# Patient Record
Sex: MALE | Employment: FULL TIME | URBAN - METROPOLITAN AREA
[De-identification: names, ages, dates, MRNs, and addresses within clinical notes are randomized per-mention and may not be internally consistent; named-entity substitution may affect disease eponyms.]

---

## 2024-05-23 ENCOUNTER — HOSPITAL ENCOUNTER (OUTPATIENT)
Facility: MEDICAL CENTER | Age: 39
End: 2024-05-23
Attending: PREVENTIVE MEDICINE
Payer: COMMERCIAL

## 2024-05-23 ENCOUNTER — EH NON-PROVIDER (OUTPATIENT)
Dept: OCCUPATIONAL MEDICINE | Facility: CLINIC | Age: 39
End: 2024-05-23

## 2024-05-23 ENCOUNTER — EMPLOYEE HEALTH (OUTPATIENT)
Dept: OCCUPATIONAL MEDICINE | Facility: CLINIC | Age: 39
End: 2024-05-23

## 2024-05-23 DIAGNOSIS — Z02.89 VISIT FOR OCCUPATIONAL HEALTH EXAMINATION: Primary | ICD-10-CM

## 2024-05-23 DIAGNOSIS — Z02.89 VISIT FOR OCCUPATIONAL HEALTH EXAMINATION: ICD-10-CM

## 2024-05-23 LAB
AMP AMPHETAMINE: NORMAL
BAR BARBITURATES: NORMAL
BZO BENZODIAZEPINES: NORMAL
COC COCAINE: NORMAL
INT CON NEG: NORMAL
INT CON POS: NORMAL
MDMA ECSTASY: NORMAL
MET METHAMPHETAMINES: NORMAL
MTD METHADONE: NORMAL
OPI OPIATES: NORMAL
OXY OXYCODONE: NORMAL
PCP PHENCYCLIDINE: NORMAL
POC URINE DRUG SCREEN OCDRS: NORMAL
THC: NORMAL

## 2024-05-23 PROCEDURE — 94375 RESPIRATORY FLOW VOLUME LOOP: CPT | Performed by: PREVENTIVE MEDICINE

## 2024-05-23 PROCEDURE — 86480 TB TEST CELL IMMUN MEASURE: CPT | Performed by: PREVENTIVE MEDICINE

## 2024-05-23 PROCEDURE — 8915 PR COMPREHENSIVE PHYSICAL: Performed by: PREVENTIVE MEDICINE

## 2024-05-23 PROCEDURE — 80305 DRUG TEST PRSMV DIR OPT OBS: CPT | Performed by: PREVENTIVE MEDICINE

## 2024-05-23 NOTE — PROGRESS NOTES
Pre Employment Drug Screen Completed  Mask Fit Required at this time  Physical is required at this time.  Docs: Hep B, MMR, VZV, COVID, and Tdap   Titers: TB  Color Blindness 14/14

## 2024-05-26 LAB
GAMMA INTERFERON BACKGROUND BLD IA-ACNC: 0.02 IU/ML
M TB IFN-G BLD-IMP: NEGATIVE
M TB IFN-G CD4+ BCKGRND COR BLD-ACNC: 0 IU/ML
MITOGEN IGNF BCKGRD COR BLD-ACNC: >10 IU/ML
QFT TB2 - NIL TBQ2: 0.01 IU/ML

## 2024-09-17 ENCOUNTER — OFFICE VISIT (OUTPATIENT)
Dept: URGENT CARE | Facility: CLINIC | Age: 39
End: 2024-09-17
Payer: COMMERCIAL

## 2024-09-17 VITALS
OXYGEN SATURATION: 98 % | HEIGHT: 74 IN | TEMPERATURE: 98.2 F | RESPIRATION RATE: 16 BRPM | SYSTOLIC BLOOD PRESSURE: 128 MMHG | BODY MASS INDEX: 23.74 KG/M2 | HEART RATE: 74 BPM | DIASTOLIC BLOOD PRESSURE: 76 MMHG | WEIGHT: 185 LBS

## 2024-09-17 DIAGNOSIS — F41.9 ANXIETY AND DEPRESSION: ICD-10-CM

## 2024-09-17 DIAGNOSIS — Z76.0 ENCOUNTER FOR MEDICATION REFILL: ICD-10-CM

## 2024-09-17 DIAGNOSIS — F32.A ANXIETY AND DEPRESSION: ICD-10-CM

## 2024-09-17 PROCEDURE — 3074F SYST BP LT 130 MM HG: CPT | Performed by: STUDENT IN AN ORGANIZED HEALTH CARE EDUCATION/TRAINING PROGRAM

## 2024-09-17 PROCEDURE — 3078F DIAST BP <80 MM HG: CPT | Performed by: STUDENT IN AN ORGANIZED HEALTH CARE EDUCATION/TRAINING PROGRAM

## 2024-09-17 PROCEDURE — 99202 OFFICE O/P NEW SF 15 MIN: CPT | Performed by: STUDENT IN AN ORGANIZED HEALTH CARE EDUCATION/TRAINING PROGRAM

## 2024-09-17 RX ORDER — FLUOXETINE HYDROCHLORIDE 60 MG/1
60 TABLET, FILM COATED ORAL; ORAL DAILY
Qty: 30 TABLET | Refills: 2 | Status: SHIPPED | OUTPATIENT
Start: 2024-09-17

## 2024-09-17 NOTE — PROGRESS NOTES
"Subjective:   John Adhikari is a 38 y.o. male who presents for Medication Refill (Is wanting a refill on fluoxetine, patient just moved and changed insurance and doesn't have a primary. )      HPI:  38-year-old male presents to urgent care for medication refill of fluoxetine.  Takes this medication for both anxiety and depression.  Has been on this medication for 4 years.  He just moved to the area and is trying to get established with a PCP.  Takes 60 mg daily.  Has been stable on this dose.  Denies any side effects.  No complaint this time.  Negative ROS.    Medications:    FLUoxetine HCl Tabs    Allergies: Patient has no known allergies.    Problem List: John Adhikari does not have a problem list on file.    Surgical History:  No past surgical history on file.    Past Social Hx: John Adhikari  reports that he has never smoked. He has never used smokeless tobacco. He reports that he does not currently use alcohol. He reports that he does not use drugs.     Past Family Hx:  John Adhikari family history is not on file.     Problem list, medications, and allergies reviewed by myself today in Epic.     Objective:     /76   Pulse 74   Temp 36.8 °C (98.2 °F) (Temporal)   Resp 16   Ht 1.88 m (6' 2\")   Wt 83.9 kg (185 lb)   SpO2 98%   BMI 23.75 kg/m²     Physical Exam  Vitals reviewed.   Cardiovascular:      Rate and Rhythm: Normal rate.      Pulses: Normal pulses.   Pulmonary:      Effort: Pulmonary effort is normal.         Assessment/Plan:     Diagnosis and associated orders:     1. Anxiety and depression  FLUoxetine HCl 60 MG Tab      2. Encounter for medication refill  FLUoxetine HCl 60 MG Tab         Comments/MDM:     Patient is here for medication refill of fluoxetine that he takes daily at 60 mg.  Confirmed medication with patient.  Patient is new to the area and is try to get established with new PCP.  Refill sent to the pharmacy.  Patient has no complaint this time.   "       Differential diagnosis, natural history, supportive care, and indications for immediate follow-up discussed.    Advised the patient to follow-up with the primary care physician for recheck, reevaluation, and consideration of further management.    Please note that this dictation was created using voice recognition software. I have made a reasonable attempt to correct obvious errors, but I expect that there are errors of grammar and possibly content that I did not discover before finalizing the note.    Electronically signed by Leoncio Camejo PA-C.

## 2025-02-24 ENCOUNTER — APPOINTMENT (OUTPATIENT)
Dept: RADIOLOGY | Facility: MEDICAL CENTER | Age: 40
End: 2025-02-24
Attending: EMERGENCY MEDICINE
Payer: COMMERCIAL

## 2025-02-24 ENCOUNTER — HOSPITAL ENCOUNTER (EMERGENCY)
Facility: MEDICAL CENTER | Age: 40
End: 2025-02-24
Attending: EMERGENCY MEDICINE
Payer: COMMERCIAL

## 2025-02-24 VITALS
DIASTOLIC BLOOD PRESSURE: 84 MMHG | HEART RATE: 60 BPM | TEMPERATURE: 97.8 F | BODY MASS INDEX: 22.95 KG/M2 | HEIGHT: 74 IN | OXYGEN SATURATION: 100 % | RESPIRATION RATE: 16 BRPM | WEIGHT: 178.79 LBS | SYSTOLIC BLOOD PRESSURE: 136 MMHG

## 2025-02-24 DIAGNOSIS — M54.2 NECK PAIN: ICD-10-CM

## 2025-02-24 PROCEDURE — 99283 EMERGENCY DEPT VISIT LOW MDM: CPT

## 2025-02-24 PROCEDURE — 76536 US EXAM OF HEAD AND NECK: CPT

## 2025-02-24 ASSESSMENT — FIBROSIS 4 INDEX: FIB4 SCORE: 0.7

## 2025-02-24 NOTE — ED PROVIDER NOTES
"ED Provider Note    CHIEF COMPLAINT  Chief Complaint   Patient presents with    Neck Pain     Started last night  States pain is worse on Right side  Denies any recent injuries/illnesses       EXTERNAL RECORDS REVIEWED  Patient's last and only other encounter in our EMR is from September of last year he was seen for medication refill.  Patient has a history of anxiety and depression.  He was requesting refill of fluoxetine.    HPI/ROS  LIMITATION TO HISTORY   Select: : None  OUTSIDE HISTORIAN(S):  None    John Adhikari is a 39 y.o. male who presents emergency department with a chief complaint of right-sided neck pain.  Patient states he woke up with symptoms.  He has had some congestion and cough recently.  No fever.  No injuries or falls reported.  There is no overlying skin changes.  When he pushes on the area, he is able to feel his pulse and he was very concerned that there was something wrong with his carotid artery.  No history of stroke or MI.  He is not anticoagulated.  Denies a sore throat or ear pain.    PAST MEDICAL HISTORY   None reported    SURGICAL HISTORY  patient denies any surgical history    FAMILY HISTORY  No family history on file.    SOCIAL HISTORY  Social History     Tobacco Use    Smoking status: Never    Smokeless tobacco: Never   Vaping Use    Vaping status: Never Used   Substance and Sexual Activity    Alcohol use: Not Currently    Drug use: Never    Sexual activity: Yes       CURRENT MEDICATIONS  Home Medications    **Home medications have not yet been reviewed for this encounter**         ALLERGIES  No Known Allergies    PHYSICAL EXAM  VITAL SIGNS: /84   Pulse 60   Temp 36.6 °C (97.8 °F) (Temporal)   Resp 16   Ht 1.88 m (6' 2\")   Wt 81.1 kg (178 lb 12.7 oz)   SpO2 100%   BMI 22.96 kg/m²    Vitals reviewed.  Constitutional: Patient is oriented to person, place, and time. Appears well-developed and well-nourished. No distress.    Head: Normocephalic and atraumatic. "   Mouth/Throat: Mask in place  Eyes: Conjunctivae are normal. Pupils are equal, round, and reactive to light.   Neck: Normal range of motion. Neck supple. No tracheal deviation present.  No asymmetry.  No neck swelling.  No overlying skin changes.  There is tenderness to palpation mid neck over the right side, overlying the carotid artery.  Cardiovascular: Normal rate, regular rhythm and normal heart sounds.  Pulmonary/Chest: Effort normal.  No respiratory distress  Musculoskeletal: No edema and no tenderness.   Lymphadenopathy: No cervical adenopathy.   Neurological: No cranial nerve deficits. Normal gait. No focal deficits.   Skin: Skin is warm and dry. No erythema. No pallor.   Psychiatric: Patient has a normal mood and affect.     EKG/LABS    RADIOLOGY/PROCEDURES     Radiologist interpretation:  US-SOFT TISSUES OF HEAD - NECK   Final Result      1. No abnormalities identified.      2. No suspicious lymph nodes identified in the lateral neck.          COURSE & MEDICAL DECISION MAKING    ASSESSMENT, COURSE AND PLAN  Care Narrative:     This is a pleasant and overall well-appearing 39-year-old male.  He has reassuring vital signs other than high blood pressure.  There is no asymmetry on visual inspection of his neck.  There is no overlying skin changes.  There is tenderness to palpation over his mid neck, right side, overlying his carotid artery where his pulse is palpable.  He is highly concerned about an injury to his carotid artery.  I suspect it is more likely adenopathy that is causing pain.  Have ordered ultrasound for further evaluation.    12:09 PM patient's reevaluated the bedside.  We discussed ultrasound findings which are reassuring.  He is glad for this.  At this point, I feel he can safely be discharged to home.  We discussed the possibility of muscle strain versus adenopathy although that seems less likely now.  He is discharged home in stable condition.    ADDITIONAL PROBLEMS MANAGED    DISPOSITION  AND DISCUSSIONS  I have discussed management of the patient with the following physicians and JUSTINE's:  None    Discussion of management with other Q or appropriate source(s):  None     Escalation of care considered, and ultimately not performed: None    Barriers to care at this time, including but not limited to:  None.     Decision tools and prescription drugs considered including, but not limited to: None.    FINAL DIAGNOSIS  1. Neck pain         Electronically signed by: Angeline Laurent D.O., 2/24/2025 10:20 AM

## 2025-02-24 NOTE — ED TRIAGE NOTES
"Chief Complaint   Patient presents with    Neck Pain     Started last night  States pain is worse on Right side  Denies any recent injuries/illnesses     BP (!) 142/103   Pulse 70   Temp 36.6 °C (97.8 °F) (Temporal)   Resp 14   Ht 1.88 m (6' 2\")   Wt 81.1 kg (178 lb 12.7 oz)   SpO2 96%   BMI 22.96 kg/m²     Pt ambulated to ED by self for c/o Right side neck pain, sudden onset last night states feels pain \"over my carotid artery,\" denies any recent injuries or illnesses, denies hx of same.  No difficulty speaking or swallowing noted.    "